# Patient Record
Sex: MALE | NOT HISPANIC OR LATINO | Employment: OTHER | URBAN - METROPOLITAN AREA
[De-identification: names, ages, dates, MRNs, and addresses within clinical notes are randomized per-mention and may not be internally consistent; named-entity substitution may affect disease eponyms.]

---

## 2017-03-28 ENCOUNTER — OFFICE VISIT (OUTPATIENT)
Dept: NEUROSURGERY | Facility: CLINIC | Age: 57
End: 2017-03-28

## 2017-03-28 VITALS
DIASTOLIC BLOOD PRESSURE: 102 MMHG | WEIGHT: 180 LBS | BODY MASS INDEX: 22.38 KG/M2 | HEIGHT: 75 IN | SYSTOLIC BLOOD PRESSURE: 160 MMHG

## 2017-03-28 DIAGNOSIS — M54.42 CHRONIC BILATERAL LOW BACK PAIN WITH BILATERAL SCIATICA: Primary | ICD-10-CM

## 2017-03-28 DIAGNOSIS — M54.41 CHRONIC BILATERAL LOW BACK PAIN WITH BILATERAL SCIATICA: Primary | ICD-10-CM

## 2017-03-28 DIAGNOSIS — G89.29 CHRONIC BILATERAL LOW BACK PAIN WITH BILATERAL SCIATICA: Primary | ICD-10-CM

## 2017-03-28 PROBLEM — M54.50 CHRONIC BILATERAL LOW BACK PAIN: Status: ACTIVE | Noted: 2017-03-28

## 2017-03-28 PROCEDURE — 99243 OFF/OP CNSLTJ NEW/EST LOW 30: CPT | Performed by: NEUROLOGICAL SURGERY

## 2017-03-28 NOTE — PROGRESS NOTES
Subjective   Patient ID: Hugo Ayala is a 56 y.o. male who is being seen for consultation today at the request of Dr. Juan for lumbar radiculopathy. He had a lumbar MRI at [facility] on 2/16/17. He presents unaccompanied.     History of Present Illness   Patient presents for evaluation of back pain. He has had pain for 20 years due to hard work and heavy lifting. The current issues began about 3-4 months ago when he hit hard in a truck bed. He complains of constant aching low back pain associated with numbness and vibrating in his legs R>L. He has trouble with restless legs due to pain. The back and leg pain is progressively worse. They are present in any position and he cannot maintain any position for a prolonged period of time. He also reports bowel incontinence with walking but not when seated. He has a cold feeling with urination. He denies numbness of genitalia. He denies ED.  He also complains of leg weakness. They fatigue easily with activity. He reports not taking any narcotic in the past month. He has not had any recent injections or PT. He had chiropractor in past with worsening of symptoms.     Notes that he has continued to work despite the discomfort.  He had been evaluated by primary care physicians in the past.    The following portions of the patient's history were reviewed and updated as appropriate: allergies, current medications, past family history, past medical history, past social history, past surgical history and problem list.  3    Review of Systems   Constitutional: Positive for chills. Negative for fever.   Respiratory: Negative for cough and shortness of breath.    Cardiovascular: Negative for chest pain, palpitations and leg swelling.   Gastrointestinal: Negative for abdominal pain and constipation.   Genitourinary: Negative for difficulty urinating and enuresis.   Musculoskeletal: Positive for back pain and gait problem. Negative for neck pain.   Skin: Negative for rash.    Neurological: Positive for weakness (BLE) and numbness (or tingling BLE).   Hematological: Does not bruise/bleed easily.   Psychiatric/Behavioral: Negative for sleep disturbance.       Objective   Physical Exam   Constitutional: He is oriented to person, place, and time.   Chronically ill appearing; anxious; smells of cigarettes   Cardiovascular: Intact distal pulses.    Pulmonary/Chest: Effort normal.   Musculoskeletal: He exhibits no edema.        Right hip: He exhibits tenderness (positive Jovanny and Gaenslen). He exhibits normal range of motion.        Left hip: He exhibits tenderness (positive Jovanny and Gaenslen). He exhibits normal range of motion.        Lumbar back: He exhibits decreased range of motion and bony tenderness (midline lumbosacral junction).   Chepe's- pain with simulated turn, overreaction; negative axial load   Neurological: He is alert and oriented to person, place, and time. He has normal strength. He has an abnormal Tandem Gait Test.   Reflex Scores:       Tricep reflexes are 2+ on the right side and 2+ on the left side.       Bicep reflexes are 2+ on the right side and 2+ on the left side.       Brachioradialis reflexes are 2+ on the right side and 2+ on the left side.       Patellar reflexes are 2+ on the right side and 2+ on the left side.       Achilles reflexes are 2+ on the right side and 2+ on the left side.  Skin: Skin is warm and dry.   Psychiatric: His speech is normal. His mood appears anxious. He is hyperactive. Cognition and memory are normal.   Vitals reviewed.    Neurologic Exam     Mental Status   Oriented to person, place, and time.   Attention: normal. Concentration: decreased.   Speech: speech is normal   Level of consciousness: alert  General knowledge: fair.   Normal comprehension.     Motor Exam   Muscle bulk: normal  Overall muscle tone: normal    Strength   Strength 5/5 throughout.     Sensory Exam   Light touch normal.   Vibration normal.   Pinprick normal.         Temperature intact except left lateral calf     Gait, Coordination, and Reflexes     Gait  Gait: (antalgic)    Coordination   Tandem walking coordination: abnormal    Reflexes   Right brachioradialis: 2+  Left brachioradialis: 2+  Right biceps: 2+  Left biceps: 2+  Right triceps: 2+  Left triceps: 2+  Right patellar: 2+  Left patellar: 2+  Right achilles: 2+  Left achilles: 2+  Right Ji: absent  Left Ji: absent  Right ankle clonus: absent  Left ankle clonus: absent       Able to heel and toe walk       Assessment/Plan   Independent Review of Radiographic Studies:        I personally reviewed the MRI of the lumbar spine: This demonstrates some mild to moderate lumbar stenosis at L3 4 and moderate lumbar stenosis at L4 5.  This is primarily lateral recess stenosis.  There are no obvious severe nerve root compressions.      Medical Decision Making:      I confirmed and obtained the above history as recorded by the nurse practitioner acting as a scribe. I performed the above examination and it is documented by the nurse practitioner acting as a scribe.    The patient presents with the above-noted history and physical findings.  He certainly has a lot of pain.  On my review of the bar MRI I do not see a surgical lesion that would alleviate the patient's severe low back discomfort.  I do not recommend surgery at this time.  I think he would be best managed by his primary care physician with efforts at physical therapy, or even a referral to pain management.      Diagnosis: Low-back pain  Degenerative disc disease lumbar spine      Follow up PRN

## 2018-01-26 ENCOUNTER — OFFICE VISIT CONVERTED (OUTPATIENT)
Dept: INTERNAL MEDICINE | Facility: CLINIC | Age: 58
End: 2018-01-26
Attending: INTERNAL MEDICINE

## 2018-01-26 ENCOUNTER — CONVERSION ENCOUNTER (OUTPATIENT)
Dept: INTERNAL MEDICINE | Facility: CLINIC | Age: 58
End: 2018-01-26

## 2018-02-01 ENCOUNTER — OFFICE VISIT CONVERTED (OUTPATIENT)
Dept: INTERNAL MEDICINE | Facility: CLINIC | Age: 58
End: 2018-02-01
Attending: INTERNAL MEDICINE

## 2021-05-16 VITALS
BODY MASS INDEX: 22.57 KG/M2 | DIASTOLIC BLOOD PRESSURE: 76 MMHG | SYSTOLIC BLOOD PRESSURE: 120 MMHG | TEMPERATURE: 98.9 F | WEIGHT: 181.5 LBS | HEIGHT: 75 IN | RESPIRATION RATE: 22 BRPM | HEART RATE: 68 BPM | OXYGEN SATURATION: 96 %

## 2021-05-16 VITALS
SYSTOLIC BLOOD PRESSURE: 141 MMHG | OXYGEN SATURATION: 99 % | TEMPERATURE: 98.3 F | BODY MASS INDEX: 23.31 KG/M2 | DIASTOLIC BLOOD PRESSURE: 101 MMHG | RESPIRATION RATE: 16 BRPM | HEART RATE: 87 BPM | WEIGHT: 187.5 LBS | HEIGHT: 75 IN

## 2022-05-26 ENCOUNTER — OFFICE VISIT (OUTPATIENT)
Dept: UROLOGY | Facility: CLINIC | Age: 62
End: 2022-05-26

## 2022-05-26 VITALS — HEIGHT: 75 IN | WEIGHT: 226.8 LBS | BODY MASS INDEX: 28.2 KG/M2 | RESPIRATION RATE: 16 BRPM

## 2022-05-26 DIAGNOSIS — N40.0 BENIGN PROSTATIC HYPERPLASIA, UNSPECIFIED WHETHER LOWER URINARY TRACT SYMPTOMS PRESENT: ICD-10-CM

## 2022-05-26 DIAGNOSIS — N35.911 STRICTURE OF URETHRAL MEATUS IN MALE, UNSPECIFIED STRICTURE TYPE: ICD-10-CM

## 2022-05-26 DIAGNOSIS — R39.11 URINARY HESITANCY: Primary | ICD-10-CM

## 2022-05-26 PROBLEM — E78.5 HYPERLIPIDEMIA: Status: ACTIVE | Noted: 2022-05-26

## 2022-05-26 PROBLEM — F32.A DEPRESSION: Status: ACTIVE | Noted: 2022-05-26

## 2022-05-26 PROBLEM — F41.9 ANXIETY: Status: ACTIVE | Noted: 2022-05-26

## 2022-05-26 PROBLEM — S09.90XA HEAD INJURY: Status: ACTIVE | Noted: 2022-05-26

## 2022-05-26 PROBLEM — F10.20 ALCOHOLISM (HCC): Status: ACTIVE | Noted: 2022-05-26

## 2022-05-26 PROBLEM — R68.89 FORGETFULNESS: Status: ACTIVE | Noted: 2022-05-26

## 2022-05-26 PROBLEM — D64.9 ANEMIA: Status: ACTIVE | Noted: 2022-05-26

## 2022-05-26 PROBLEM — G43.909 MIGRAINE: Status: ACTIVE | Noted: 2022-05-26

## 2022-05-26 PROBLEM — M19.90 ARTHRITIS: Status: ACTIVE | Noted: 2022-05-26

## 2022-05-26 PROBLEM — I10 HYPERTENSION: Status: ACTIVE | Noted: 2022-05-26

## 2022-05-26 LAB — URINE VOLUME: 63

## 2022-05-26 PROCEDURE — 51798 US URINE CAPACITY MEASURE: CPT | Performed by: NURSE PRACTITIONER

## 2022-05-26 PROCEDURE — 99203 OFFICE O/P NEW LOW 30 MIN: CPT | Performed by: NURSE PRACTITIONER

## 2022-05-26 RX ORDER — PRAZOSIN HYDROCHLORIDE 1 MG/1
1 CAPSULE ORAL DAILY
COMMUNITY
Start: 2022-04-05 | End: 2023-03-22

## 2022-05-26 RX ORDER — DIVALPROEX SODIUM 250 MG/1
250 TABLET, DELAYED RELEASE ORAL DAILY
COMMUNITY
Start: 2022-05-08 | End: 2023-03-22

## 2022-05-26 RX ORDER — OLANZAPINE 10 MG/1
10 TABLET ORAL DAILY
COMMUNITY
Start: 2022-05-10 | End: 2023-03-22

## 2022-05-26 RX ORDER — IBUPROFEN 600 MG/1
600 TABLET ORAL EVERY 8 HOURS
COMMUNITY
Start: 2022-05-10 | End: 2023-03-22

## 2022-05-26 RX ORDER — SIMVASTATIN 20 MG
20 TABLET ORAL DAILY
COMMUNITY
Start: 2022-05-09 | End: 2023-03-22

## 2022-05-26 RX ORDER — TAMSULOSIN HYDROCHLORIDE 0.4 MG/1
1 CAPSULE ORAL DAILY
Qty: 90 CAPSULE | Refills: 3 | Status: SHIPPED | OUTPATIENT
Start: 2022-05-26 | End: 2023-03-10

## 2022-05-26 RX ORDER — AMLODIPINE BESYLATE 10 MG/1
10 TABLET ORAL DAILY
COMMUNITY
Start: 2022-04-05 | End: 2023-03-22

## 2022-05-26 RX ORDER — ASPIRIN 81 MG/1
81 TABLET, CHEWABLE ORAL DAILY
COMMUNITY
Start: 2022-05-09 | End: 2023-03-22

## 2022-05-26 RX ORDER — CLONIDINE HYDROCHLORIDE 0.2 MG/1
0.2 TABLET ORAL DAILY
COMMUNITY
Start: 2022-04-05 | End: 2023-03-22

## 2022-05-26 NOTE — PROGRESS NOTES
Chief Complaint: Urinary Frequency    Subjective         History of Present Illness  Hugo Ayala is a 62 y.o. male presents to [facility] UROLOGY to be seen for urinary frequency.    Patient reports for the last 6 months urinary hesitancy and a weak and intermittent stream.    He states that he has issues with constipation as well. He reports that he had a back injury several years ago and since that time he is unable to feel when he has a bowel movement and goes every 3-4 days.    He states he has spraying and splaying of stream.     Urinary frequency.     Nocturia x 4     Drinks 5 cups of coffee a day and mountian dew.     Objective     Past Medical History:   Diagnosis Date   • Arthritis    • Hypertension        Past Surgical History:   Procedure Laterality Date   • APPENDECTOMY     • EYE SURGERY  2007   • KNEE SURGERY Right 2005         Current Outpatient Medications:   •  amLODIPine (NORVASC) 10 MG tablet, Take 10 mg by mouth Daily., Disp: , Rfl:   •  aspirin 81 MG chewable tablet, Chew 81 mg Daily., Disp: , Rfl:   •  cloNIDine (CATAPRES) 0.2 MG tablet, Take 0.2 mg by mouth Daily., Disp: , Rfl:   •  divalproex (DEPAKOTE) 250 MG DR tablet, Take 250 mg by mouth Daily., Disp: , Rfl:   •  ibuprofen (ADVIL,MOTRIN) 600 MG tablet, Take 600 mg by mouth Every 8 (Eight) Hours., Disp: , Rfl:   •  OLANZapine (zyPREXA) 10 MG tablet, Take 10 mg by mouth Daily., Disp: , Rfl:   •  prazosin (MINIPRESS) 1 MG capsule, Take 1 mg by mouth Daily., Disp: , Rfl:   •  simvastatin (ZOCOR) 20 MG tablet, Take 20 mg by mouth Daily., Disp: , Rfl:   •  tamsulosin (FLOMAX) 0.4 MG capsule 24 hr capsule, Take 1 capsule by mouth Daily for 360 days., Disp: 90 capsule, Rfl: 3    No Known Allergies     Family History   Problem Relation Age of Onset   • Cancer Mother    • Cancer Sister    • Cancer Brother        Social History     Socioeconomic History   • Marital status:    Tobacco Use   • Smoking status: Current Every  "Day Smoker     Packs/day: 1.00     Types: Cigarettes   • Smokeless tobacco: Never Used   Substance and Sexual Activity   • Alcohol use: Yes     Comment: occ   • Drug use: No   • Sexual activity: Defer       Vital Signs:   Resp 16   Ht 190.5 cm (75\")   Wt 103 kg (226 lb 12.8 oz)   BMI 28.35 kg/m²      Physical Exam  Genitourinary:     Comments: Mild meatal stenosis          Result Review :   The following data was reviewed by: ALVINA Pittman on 05/26/2022:  Results for orders placed or performed in visit on 05/26/22   Bladder Scan   Result Value Ref Range    Urine Volume 63        Bladder Scan interpretation 05/26/2022    Estimation of residual urine via BVI 3000 Verathon Bladder Scan  MA/nurse performing:Senthil MAYO RN   Residual Urine: 63 ml  Indication: Urinary hesitancy    Benign prostatic hyperplasia, unspecified whether lower urinary tract symptoms present    Stricture of urethral meatus in male, unspecified stricture type   Position: Supine  Examination: Incremental scanning of the suprapubic area using 2.0 MHz transducer using copious amounts of acoustic gel.   Findings: An anechoic area was demonstrated which represented the bladder, with measurement of residual urine as noted. I inspected this myself. In that the residual urine was insignificant, refer to plan for treatment and plan necessary at this time.           Procedures        Assessment and Plan    Diagnoses and all orders for this visit:    1. Urinary hesitancy (Primary)  -     Bladder Scan    2. Benign prostatic hyperplasia, unspecified whether lower urinary tract symptoms present    3. Stricture of urethral meatus in male, unspecified stricture type  -     tamsulosin (FLOMAX) 0.4 MG capsule 24 hr capsule; Take 1 capsule by mouth Daily for 360 days.  Dispense: 90 capsule; Refill: 3        Tejas with patient findings on exam as well as symptoms are consistent with meatal stenosis possible BPH.  Nevertheless I would like to start him on " tamsulosin 0.4 mg daily to see if this will help to alleviate some of his urinary urgency and frequency and straining to urinate.  Discussed common side effects of this medication and we will follow-up with him in 4 weeks or sooner if needed.    I spent  30 minutes caring for Hugo on this date of service. This time includes time spent by me in the following activities:reviewing tests, obtaining and/or reviewing a separately obtained history, performing a medically appropriate examination and/or evaluation , counseling and educating the patient/family/caregiver, ordering medications, tests, or procedures, and documenting information in the medical record  Follow Up   Return in about 4 weeks (around 6/23/2022) for f/u BPH .  Patient was given instructions and counseling regarding his condition or for health maintenance advice. Please see specific information pulled into the AVS if appropriate.         This document has been electronically signed by ALVINA Pittman  May 26, 2022 15:27 EDT

## 2022-05-27 ENCOUNTER — OFFICE VISIT (OUTPATIENT)
Dept: NEUROLOGY | Facility: CLINIC | Age: 62
End: 2022-05-27

## 2022-05-27 VITALS — HEART RATE: 71 BPM | DIASTOLIC BLOOD PRESSURE: 92 MMHG | SYSTOLIC BLOOD PRESSURE: 123 MMHG

## 2022-05-27 DIAGNOSIS — G25.0 ESSENTIAL TREMOR: Primary | ICD-10-CM

## 2022-05-27 PROCEDURE — 99204 OFFICE O/P NEW MOD 45 MIN: CPT | Performed by: PSYCHIATRY & NEUROLOGY

## 2022-05-27 RX ORDER — PRIMIDONE 50 MG/1
TABLET ORAL
Qty: 300 TABLET | Refills: 2 | Status: SHIPPED | OUTPATIENT
Start: 2022-05-27 | End: 2023-03-22

## 2022-05-27 NOTE — PROGRESS NOTES
Chief Complaint  Neurologic Problem (HX OF STROKE.)    Subjective          Hugo Ayala is a 62 y.o. male who presents to [facility]OS[facility]GERY  History of Present Illness  62-year-old man evaluated for tremors.  He states that he was told that he had a stroke about a year ago while he was incarcerated.  His body started shaking for 3 days.  He was told that his blood pressure was low and that he had a stroke.  He did not have any focal neurologic deficits.  He denies any speech difficulty at that time.  He states that he has been living with his sister for the last 2 months and he wants to be evaluated.  He has had a tremor for several years.  His father had significant disabling tremor.  He states that the tremor bothers him with activities of daily living such as eating and drinking as well as doing fine movements.  He is taking medications at this time for anxiety and depression including divalproex sodium 250 mg, Zyprexa 10 mg.  He states that he not drink alcohol although there is alcoholism noted in his history.  He does not know if alcohol relieves the tremor.  He states that he has a tremor in his leg as well and it shakes all the time.  Objective   Vital Signs:   /92 (BP Location: Right arm, Patient Position: Sitting, Cuff Size: Adult)   Pulse 71     Physical Exam   He is alert, fluent, aphasic, follows commands well.  Optic disc are normal bilaterally visual fields full confrontation, EOMs full all directions gaze, facial strength is full, soft palate elevation and tongue normal.  There is no weakness of the upper or lower extremities individual muscle testing.  Fine finger movements are intact.  There is a tremor noted hands extended and finger-to-nose testing as he reaches the target.  He has significant difficulty transferring water from 1 cup to the next.  There is a tremor noted with his pretending to drink a cup.  There is a mild tremor noted on Archimedes  spiral.  Cerebellar testing is intact.  Reflexes are decreased in the biceps, triceps, patellar's and ankles not tested.  Station gait is able to ambulate without difficulty.  He has slight difficulty with tandem.  Armswing is normal, turning is intact.  Heart is regular rhythm normal in rate        Assessment and Plan  Diagnoses and all orders for this visit:    1. Essential tremor (Primary)  Assessment & Plan:  I discussed with him and his sister that he has essential tremor.  He wants treatment for this.  I will start him on primidone at 50 mg nightly initially and increase the dose by 50 mg every 3 days.  He is to increase it until his tremors are improved or has adverse effects.  I discussed with him the adverse effects including feeling like intoxicated, mood swings.  I gave him a titration schedule on a separate piece of paper.  He is to stay at the lowest dose that controls his tremor.  I discussed with him that we need to check liver function testing as well as CBC in the future.  I also discussed with him that it may interfere with Depakote however he is taking very low-dose of Depakote that it is unlikely to make a difference.    I discussed with him that he did not have a stroke.  I will order an MRI of the brain see him again in 7 weeks time for follow-up.    Orders:  -     MRI Brain Without Contrast; Future    Other orders  -     primidone (MYSOLINE) 50 MG tablet; Take 1 tablet nightly and increase every 3 days as directed up to 5 tablets twice daily.  Dispense: 300 tablet; Refill: 2       Total time spent with the patient and coordinating patient care was 45 minutes.    Follow Up  No follow-ups on file.  Patient was given instructions and counseling regarding his condition or for health maintenance advice. Please see specific information pulled into the AVS if appropriate.     atellar's and absent in the ankles.

## 2022-05-27 NOTE — ASSESSMENT & PLAN NOTE
I discussed with him and his sister that he has essential tremor.  He wants treatment for this.  I will start him on primidone at 50 mg nightly initially and increase the dose by 50 mg every 3 days.  He is to increase it until his tremors are improved or has adverse effects.  I discussed with him the adverse effects including feeling like intoxicated, mood swings.  I gave him a titration schedule on a separate piece of paper.  He is to stay at the lowest dose that controls his tremor.  I discussed with him that we need to check liver function testing as well as CBC in the future.  I also discussed with him that it may interfere with Depakote however he is taking very low-dose of Depakote that it is unlikely to make a difference.    I discussed with him that he did not have a stroke.  I will order an MRI of the brain see him again in 7 weeks time for follow-up.

## 2022-06-06 ENCOUNTER — TELEPHONE (OUTPATIENT)
Dept: NEUROLOGY | Facility: CLINIC | Age: 62
End: 2022-06-06

## 2022-06-06 RX ORDER — PRIMIDONE 50 MG/1
TABLET ORAL
Qty: 300 TABLET | Refills: 2 | Status: CANCELLED | OUTPATIENT
Start: 2022-06-06

## 2022-06-06 NOTE — TELEPHONE ENCOUNTER
Caller: POLO CORTEZ    Relationship: Emergency Contact; SISTER    Best call back number: [phone]     [id number] - [place], [place] - [address] AT [facility] - [phone] [facility] [phone]     What was the call regarding: PT'S SISTER CALLED STATING THAT [facility] STATES THEY NEVER RECEIVED PRIMIDONE RX FOR PT. I ADVISED PT'S SISTER THAT PHARMACY CONFIRMED RECEIVED IN 5/27/22 @ 4:35PM. PT'S SISTER IS REQUESTING THAT OFFICE RESEND RX.    Do you require a callback: IF NEEDED.    PLEASE REVIEW AND ADVISE.

## 2022-06-08 NOTE — TELEPHONE ENCOUNTER
Upon further investigation, the problem is that the insurance will not pay for primidone without a diagnosis of seizures and this is being prescribed for essential tremor. The patient will have to get this through [employer]. Attempted to call to discuss and was unable to reach anyone. Will try again later.

## 2022-06-14 NOTE — TELEPHONE ENCOUNTER
PTS SISTER STATES THAT PHARMACY TOLD HER THAT THIS IS NOT COVERED BY INSURANCE PLEASE CALL POLO BACK @ [phone]

## 2022-06-15 NOTE — TELEPHONE ENCOUNTER
Attempted to reach pt or sister and left a vm on # [phone] asking for a call  back to discuss. I had previsously called the primary # for the pt @ [phone] and it has been giving a quick busy signal and I have not been able to leave a message at that number.

## 2022-06-20 NOTE — TELEPHONE ENCOUNTER
I was able to speak to the pts sister and advised her that the pts insurance would not approve Primidone for anything other than for a diagnosis of seizures and that I could get them a GoodRx discount code that would make it approximately $25 per month and she voiced understanding but said he could not afford it because he does not currently have any income right now and is awaiting disability. She is asking if there is an alternative he may try instead.

## 2022-06-21 NOTE — TELEPHONE ENCOUNTER
He can make a follow-up visit here and I will evaluate him to see if he is a candidate for propranolol.

## 2022-06-22 ENCOUNTER — OFFICE VISIT (OUTPATIENT)
Dept: NEUROLOGY | Facility: CLINIC | Age: 62
End: 2022-06-22

## 2022-06-22 VITALS
HEART RATE: 87 BPM | HEIGHT: 75 IN | SYSTOLIC BLOOD PRESSURE: 121 MMHG | DIASTOLIC BLOOD PRESSURE: 88 MMHG | BODY MASS INDEX: 27.98 KG/M2 | WEIGHT: 225 LBS

## 2022-06-22 DIAGNOSIS — G25.0 ESSENTIAL TREMOR: Primary | ICD-10-CM

## 2022-06-22 PROCEDURE — 99213 OFFICE O/P EST LOW 20 MIN: CPT | Performed by: PSYCHIATRY & NEUROLOGY

## 2022-06-22 NOTE — PROGRESS NOTES
"Chief Complaint  Medication Problem (Insurance will not cover primidone. )    Subjective          Hugo Ayala is a 62 y.o. male who presents to [facility] NEUROLOGY & NEUROSURGERY  History of Present Illness  62-year-old man here for follow-up of his essential tremor.  His insurance would not pay for the primidone.  He has a history of COPD.  He has a history of hypertension.  He did not get his MRI of the brain performed at this time.    Objective   Vital Signs:   /88 (BP Location: Right arm, Patient Position: Sitting, Cuff Size: Adult)   Pulse 87   Ht 190.5 cm (75\")   Wt 102 kg (225 lb)   BMI 28.12 kg/m²     Physical Exam   Alert, fluent, aphasic, follows commands well.  There is a tremor noted since extended and finger-to-nose testing.  Heart is regular rhythm normal in rate.        Assessment and Plan  Diagnoses and all orders for this visit:    1. Essential tremor (Primary)  Assessment & Plan:  I discussed with him and his wife that he should be started on propanolol by his primary care provider since he has hypertension and COPD.  I discussed with him that I do not know if he has asthmatic component to his COPD.  I would recommend for him to be started on propranolol at 40 mg twice daily the first week and increase the dose as needed according to his tremor control to 40 mg every week in two divided doses until is taking up to 120 mg twice a day.  He is to stay at the lowest dose that controls his tremors.  I discussed with his wife that they need to take his pulse prior to increasing the dose weekly.  I discussed with him the adverse effects of propranolol.  See him again in 2 months time for follow-up.         Total time spent with the patient and coordinating patient care was 25 minutes.    Follow Up  No follow-ups on file.  Patient was given instructions and counseling regarding his condition or for health maintenance advice. Please see specific information pulled into the " AVS if appropriate.

## 2022-06-22 NOTE — ASSESSMENT & PLAN NOTE
I discussed with him and his wife that he should be started on propanolol by his primary care provider since he has hypertension and COPD.  I discussed with him that I do not know if he has asthmatic component to his COPD.  I would recommend for him to be started on propranolol at 40 mg twice daily the first week and increase the dose as needed according to his tremor control to 40 mg every week in two divided doses until is taking up to 120 mg twice a day.  He is to stay at the lowest dose that controls his tremors.  I discussed with his wife that they need to take his pulse prior to increasing the dose weekly.  I discussed with him the adverse effects of propranolol.  See him again in 2 months time for follow-up.

## 2022-06-27 ENCOUNTER — HOSPITAL ENCOUNTER (OUTPATIENT)
Dept: MRI IMAGING | Facility: HOSPITAL | Age: 62
Discharge: HOME | End: 2022-06-27
Admitting: PSYCHIATRY & NEUROLOGY

## 2022-06-27 DIAGNOSIS — G25.0 ESSENTIAL TREMOR: ICD-10-CM

## 2022-06-27 PROCEDURE — 70551 MRI BRAIN STEM W/O DYE: CPT

## 2022-07-01 ENCOUNTER — TELEPHONE (OUTPATIENT)
Dept: UROLOGY | Facility: CLINIC | Age: 62
End: 2022-07-01

## 2022-07-06 NOTE — TELEPHONE ENCOUNTER
3RD ATTEMPT CALLED PT TO TRY TO R/S NO SHOWED APPT WITH [facility] FROM 7/1/NVM FOR PT/MS/ANYTHING ELSE TO DO?

## 2022-08-15 ENCOUNTER — TELEPHONE (OUTPATIENT)
Dept: NEUROLOGY | Facility: CLINIC | Age: 62
End: 2022-08-15

## 2022-08-15 NOTE — TELEPHONE ENCOUNTER
PATIENT IS TELLING PCP THAT DR DAVENPORT WANTS HIM TO REFILL THE PRIMIDONE SCRIPT.    PCP IS NOT COMFORTABLE DOING THAT.    PLEASE ADVISE.    THANK YOU

## 2022-08-15 NOTE — TELEPHONE ENCOUNTER
"Per last office note, patient's insurance will not cover primidone \" discussed with him and his wife that he should be started on propanolol by his primary care provider since he has hypertension and COPD.  I discussed with him that I do not know if he has asthmatic component to his COPD.  I would recommend for him to be started on propranolol at 40 mg twice daily the first week \"     Will need to call patient to verify who his PCP is as it looks like he no showed appt with [facility] in July.  "

## 2022-08-16 NOTE — TELEPHONE ENCOUNTER
Called primary number on file but it his brother in law's phone, patient was not available to talk, he asked me to call his sister at [phone] voicemail is not set up. Need to clarify who patient's pcp is as Dr. Samuels's office stated they do not have a patient with that matching information.

## 2022-08-18 NOTE — TELEPHONE ENCOUNTER
Have not been able to contact patient, reached out to Emily Siddiqui's office as her name was on the referral when he was initially seen by us but not listed in his chart, that is his PCP office. They stated that they were able to pull records off of [facility] and see the recommendation for propranolol & have discussed & prescribed. I have updated epic PCP.

## 2023-03-10 DIAGNOSIS — N35.911 STRICTURE OF URETHRAL MEATUS IN MALE, UNSPECIFIED STRICTURE TYPE: ICD-10-CM

## 2023-03-10 RX ORDER — TAMSULOSIN HYDROCHLORIDE 0.4 MG/1
1 CAPSULE ORAL DAILY
Qty: 30 CAPSULE | Refills: 0 | Status: SHIPPED | OUTPATIENT
Start: 2023-03-10 | End: 2023-03-22

## 2023-03-10 NOTE — TELEPHONE ENCOUNTER
Tried contacting pt to let him know that he will need a follow up appointment to have his tamsulosin refilled. Pt didn't have voicemail box set up and none of his other contact numbers were in service. I will send one months worth of it to his pharmacy but he needs an appt.

## 2023-03-22 ENCOUNTER — APPOINTMENT (OUTPATIENT)
Dept: GENERAL RADIOLOGY | Facility: HOSPITAL | Age: 63
End: 2023-03-22
Payer: COMMERCIAL

## 2023-03-22 ENCOUNTER — HOSPITAL ENCOUNTER (EMERGENCY)
Facility: HOSPITAL | Age: 63
Discharge: HOME | End: 2023-03-22
Attending: EMERGENCY MEDICINE | Admitting: EMERGENCY MEDICINE
Payer: COMMERCIAL

## 2023-03-22 VITALS
HEART RATE: 94 BPM | SYSTOLIC BLOOD PRESSURE: 128 MMHG | OXYGEN SATURATION: 98 % | HEIGHT: 75 IN | WEIGHT: 212.3 LBS | TEMPERATURE: 98.1 F | DIASTOLIC BLOOD PRESSURE: 88 MMHG | BODY MASS INDEX: 26.4 KG/M2 | RESPIRATION RATE: 24 BRPM

## 2023-03-22 DIAGNOSIS — S39.92XA INJURY OF LOW BACK, INITIAL ENCOUNTER: Primary | ICD-10-CM

## 2023-03-22 DIAGNOSIS — M54.50 ACUTE LEFT-SIDED LOW BACK PAIN, UNSPECIFIED WHETHER SCIATICA PRESENT: ICD-10-CM

## 2023-03-22 DIAGNOSIS — M51.36 DEGENERATION, INTERVERTEBRAL DISC, LUMBAR: ICD-10-CM

## 2023-03-22 PROCEDURE — 96372 THER/PROPH/DIAG INJ SC/IM: CPT

## 2023-03-22 PROCEDURE — 99283 EMERGENCY DEPT VISIT LOW MDM: CPT

## 2023-03-22 PROCEDURE — 25010000002 KETOROLAC TROMETHAMINE PER 15 MG: Performed by: NURSE PRACTITIONER

## 2023-03-22 PROCEDURE — 63710000001 PREDNISONE PER 1 MG: Performed by: NURSE PRACTITIONER

## 2023-03-22 PROCEDURE — 72100 X-RAY EXAM L-S SPINE 2/3 VWS: CPT

## 2023-03-22 RX ORDER — KETOROLAC TROMETHAMINE 10 MG/1
10 TABLET, FILM COATED ORAL EVERY 6 HOURS PRN
Qty: 20 TABLET | Refills: 0 | Status: SHIPPED | OUTPATIENT
Start: 2023-03-22

## 2023-03-22 RX ORDER — SODIUM CHLORIDE 0.9 % (FLUSH) 0.9 %
10 SYRINGE (ML) INJECTION AS NEEDED
Status: DISCONTINUED | OUTPATIENT
Start: 2023-03-22 | End: 2023-03-22

## 2023-03-22 RX ORDER — CYCLOBENZAPRINE HCL 10 MG
10 TABLET ORAL 3 TIMES DAILY PRN
Qty: 30 TABLET | Refills: 0 | Status: SHIPPED | OUTPATIENT
Start: 2023-03-22

## 2023-03-22 RX ORDER — PREDNISONE 20 MG/1
40 TABLET ORAL ONCE
Status: COMPLETED | OUTPATIENT
Start: 2023-03-22 | End: 2023-03-22

## 2023-03-22 RX ORDER — CYCLOBENZAPRINE HCL 10 MG
10 TABLET ORAL ONCE
Status: COMPLETED | OUTPATIENT
Start: 2023-03-22 | End: 2023-03-22

## 2023-03-22 RX ORDER — KETOROLAC TROMETHAMINE 30 MG/ML
30 INJECTION, SOLUTION INTRAMUSCULAR; INTRAVENOUS ONCE
Status: COMPLETED | OUTPATIENT
Start: 2023-03-22 | End: 2023-03-22

## 2023-03-22 RX ORDER — PREDNISONE 20 MG/1
20 TABLET ORAL 2 TIMES DAILY
Qty: 10 TABLET | Refills: 0 | Status: SHIPPED | OUTPATIENT
Start: 2023-03-22

## 2023-03-22 RX ADMIN — CYCLOBENZAPRINE 10 MG: 10 TABLET, FILM COATED ORAL at 18:06

## 2023-03-22 RX ADMIN — KETOROLAC TROMETHAMINE 30 MG: 30 INJECTION, SOLUTION INTRAMUSCULAR; INTRAVENOUS at 17:41

## 2023-03-22 RX ADMIN — PREDNISONE 40 MG: 20 TABLET ORAL at 18:07

## 2023-03-22 NOTE — ED PROVIDER NOTES
Time: 5:14 PM EDT  Date of encounter:  3/22/2023  Independent Historian/Clinical History and Information was obtained by:   Patient  Chief Complaint: Low back pain    History is limited by: N/A    History of Present Illness:  Patient is a 62 y.o. year old male who presents to the emergency department for evaluation of low back pain that started yesterday morning when he picked up a coffee table.  He said it is happened several times but its been a couple of years since it happened.  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  He says he has slipped disks and degenerative disc disease.  He reports his pain is radiating down both hips and up his back.  The primary source of pain is on the low lower left lumbar region.  He denies urinary or fecal incontinence and saddle anesthesia.    HPI    Patient Care Team  Primary Care Provider: Emily Siddiqui APRN    Past Medical History:     No Known Allergies  Past Medical History:   Diagnosis Date   • Anxiety    • Arthritis    • COPD (chronic obstructive pulmonary disease) (HCC)    • Hypertension    • Injury of back      Past Surgical History:   Procedure Laterality Date   • APPENDECTOMY     • EYE SURGERY  2007   • KNEE SURGERY Right 2005     Family History   Problem Relation Age of Onset   • Cancer Mother    • Cancer Sister    • Cancer Brother        Home Medications:  Prior to Admission medications    Medication Sig Start Date End Date Taking? Authorizing Provider   amLODIPine (NORVASC) 10 MG tablet Take 10 mg by mouth Daily. 4/5/22 3/22/23  Fanny Lamb MD   aspirin 81 MG chewable tablet Chew 81 mg Daily. 5/9/22 3/22/23  Fanny Lamb MD   cloNIDine (CATAPRES) 0.2 MG tablet Take 0.2 mg by mouth Daily. 4/5/22 3/22/23  Fanny Lamb MD   divalproex (DEPAKOTE) 250 MG DR tablet Take 250 mg by mouth Daily. 5/8/22 3/22/23  Fanny Lamb MD   ibuprofen (ADVIL,MOTRIN) 600 MG tablet Take  "600 mg by mouth Every 8 (Eight) Hours. 5/10/22 3/22/23  Fanny Lamb MD   OLANZapine (zyPREXA) 10 MG tablet Take 10 mg by mouth Daily. 5/10/22 3/22/23  Fanny Lamb MD   prazosin (MINIPRESS) 1 MG capsule Take 1 mg by mouth Daily. 4/5/22 3/22/23  Fanny Lamb MD   primidone (MYSOLINE) 50 MG tablet Take 1 tablet nightly and increase every 3 days as directed up to 5 tablets twice daily. 5/27/22 3/22/23  Ladarius Alvarado MD   simvastatin (ZOCOR) 20 MG tablet Take 20 mg by mouth Daily. 5/9/22 3/22/23  Fanny Lamb MD   tamsulosin (FLOMAX) 0.4 MG capsule 24 hr capsule Take 1 capsule by mouth Daily. 3/10/23 3/22/23  Becky Joy, APRN        Social History:   Social History     Tobacco Use   • Smoking status: Every Day     Packs/day: 1.00     Types: Cigars, Cigarettes   • Smokeless tobacco: Never   Substance Use Topics   • Alcohol use: Not Currently     Comment: occ   • Drug use: No         Review of Systems:  Review of Systems   Musculoskeletal: Positive for back pain and gait problem.   Neurological: Negative for numbness.        Physical Exam:  /88 (BP Location: Left arm, Patient Position: Sitting)   Pulse 94   Temp 98.1 °F (36.7 °C) (Oral)   Resp 24   Ht 190.5 cm (75\")   Wt 96.3 kg (212 lb 4.9 oz)   SpO2 98%   BMI 26.54 kg/m²     Physical Exam  Constitutional:       General: He is not in acute distress.     Appearance: Normal appearance. He is normal weight. He is not ill-appearing or toxic-appearing.   Cardiovascular:      Rate and Rhythm: Normal rate.   Pulmonary:      Effort: Pulmonary effort is normal. No respiratory distress.   Musculoskeletal:         General: Tenderness (Low back pain radiating down the hips and up) present.      Cervical back: Normal range of motion and neck supple.   Skin:     General: Skin is dry.   Neurological:      General: No focal deficit present.      Mental Status: He is alert and oriented to person, place, and time.      " Sensory: No sensory deficit.   Psychiatric:         Mood and Affect: Mood normal.         Behavior: Behavior normal.                  Procedures:  Procedures      Medical Decision Making:      Comorbidities that affect care:    Arthritis, anxiety injury, COPD, Hypertension    External Notes reviewed:    None      The following orders were placed and all results were independently analyzed by me:  Orders Placed This Encounter   Procedures   • XR Spine Lumbar 2 or 3 View       Medications Given in the Emergency Department:  Medications   ketorolac (TORADOL) injection 30 mg (30 mg Intramuscular Given 3/22/23 1741)   predniSONE (DELTASONE) tablet 40 mg (40 mg Oral Given 3/22/23 1807)   cyclobenzaprine (FLEXERIL) tablet 10 mg (10 mg Oral Given 3/22/23 1806)        ED Course:         Labs:    Lab Results (last 24 hours)     ** No results found for the last 24 hours. **           Imaging:    XR Spine Lumbar 2 or 3 View    Result Date: 3/22/2023  PROCEDURE: XR SPINE LUMBAR 2 OR 3 VW  COMPARISON: [facility], CT, CT CHEST ABD PEL W CONTRAST, 11/25/2018, 20:38.  [facility], CR, LS-SPINE - AP & LAT, 4/23/2017, 13:00.  INDICATIONS: pain, injury  FINDINGS:  Vertebral body heights are well maintained.  Mild narrowing of lumbar intervertebral disc spaces is seen, with endplate spurring throughout the lumbar spine.  Moderate facet arthropathy is seen throughout the lumbar spine, most severe at L4-5 and L5-S1.  No fractures or subluxations are seen.  The abdominal aorta is ectatic.  Infrarenal abdominal aortic aneurysm measures at least 3 cm in diameter.         Lumbar spine series demonstrating multi level degenerative change.  Fusiform infrarenal abdominal aortic aneurysm measures at least 3 cm in diameter.     CHUCHO LANZA MD       Electronically Signed and Approved By: CHUCHO LANZA MD on 3/22/2023 at 18:24                 Differential Diagnosis and Discussion:    Back Pain: The patient presents  with back pain. My differential diagnosis includes but is not limited to acute spinal epidural abscess, acute spinal epidural bleed, cauda equina syndrome, abdominal aortic aneurysm, aortic dissection, kidney stone, pyelonephritis, musculoskeletal back pain, spinal fracture, and osteoarthritis.     All X-rays were independently reviewed by me.    MDM  Number of Diagnoses or Management Options     Amount and/or Complexity of Data Reviewed  Tests in the radiology section of CPT®: reviewed             Patient Care Considerations:    NARCOTICS: I considered prescribing opiate pain medication as an outpatient, however They were not indicated.      Consultants/Shared Management Plan:    None    Social Determinants of Health:    Patient is independent, reliable, and has access to care.       Disposition and Care Coordination:    Discharged: The patient is suitable and stable for discharge with no need for consideration of observation or admission.    I have explained the patient´s condition, diagnoses and treatment plan based on the information available to me at this time. I have answered questions and addressed any concerns. The patient has a good  understanding of the patient´s diagnosis, condition, and treatment plan as can be expected at this point. The vital signs have been stable. The patient´s condition is stable and appropriate for discharge from the emergency department.      The patient will pursue further outpatient evaluation with the primary care physician or other designated or consulting physician as outlined in the discharge instructions. They are agreeable to this plan of care and follow-up instructions have been explained in detail. The patient has received these instructions in written format and have expressed an understanding of the discharge instructions. The patient is aware that any significant change in condition or worsening of symptoms should prompt an immediate return to this or the closest  emergency department or call to 911.    Final diagnoses:   Injury of low back, initial encounter   Degeneration, intervertebral disc, lumbar   Acute left-sided low back pain, unspecified whether sciatica present        ED Disposition     ED Disposition   Discharge    Condition   Stable    Comment   --             This medical record created using voice recognition software.           Olga Velazquez, APRN  03/22/23 5147

## 2024-04-13 ENCOUNTER — HOSPITAL ENCOUNTER (EMERGENCY)
Facility: HOSPITAL | Age: 64
Discharge: LEFT BEFORE DISCHARGE | End: 2024-04-13
Payer: MEDICAID

## 2024-04-13 ENCOUNTER — APPOINTMENT (OUTPATIENT)
Dept: CT IMAGING | Facility: HOSPITAL | Age: 64
End: 2024-04-13
Payer: MEDICAID

## 2024-04-13 VITALS
SYSTOLIC BLOOD PRESSURE: 147 MMHG | BODY MASS INDEX: 21.71 KG/M2 | TEMPERATURE: 97.6 F | HEIGHT: 75 IN | RESPIRATION RATE: 18 BRPM | HEART RATE: 97 BPM | OXYGEN SATURATION: 98 % | WEIGHT: 174.6 LBS | DIASTOLIC BLOOD PRESSURE: 81 MMHG

## 2024-04-13 LAB
ALBUMIN SERPL-MCNC: 3.8 G/DL (ref 3.5–5.2)
ALBUMIN/GLOB SERPL: 1.5 G/DL
ALP SERPL-CCNC: 121 U/L (ref 39–117)
ALT SERPL W P-5'-P-CCNC: 17 U/L (ref 1–41)
AMPHET+METHAMPHET UR QL: POSITIVE
ANION GAP SERPL CALCULATED.3IONS-SCNC: 10.7 MMOL/L (ref 5–15)
APAP SERPL-MCNC: <5 MCG/ML (ref 0–30)
AST SERPL-CCNC: 22 U/L (ref 1–40)
BARBITURATES UR QL SCN: NEGATIVE
BASOPHILS # BLD AUTO: 0.05 10*3/MM3 (ref 0–0.2)
BASOPHILS NFR BLD AUTO: 0.8 % (ref 0–1.5)
BENZODIAZ UR QL SCN: NEGATIVE
BILIRUB SERPL-MCNC: <0.2 MG/DL (ref 0–1.2)
BUN SERPL-MCNC: 20 MG/DL (ref 8–23)
BUN/CREAT SERPL: 18.3 (ref 7–25)
CALCIUM SPEC-SCNC: 9.8 MG/DL (ref 8.6–10.5)
CANNABINOIDS SERPL QL: POSITIVE
CHLORIDE SERPL-SCNC: 104 MMOL/L (ref 98–107)
CO2 SERPL-SCNC: 26.3 MMOL/L (ref 22–29)
COCAINE UR QL: NEGATIVE
CREAT SERPL-MCNC: 1.09 MG/DL (ref 0.76–1.27)
DEPRECATED RDW RBC AUTO: 44.6 FL (ref 37–54)
EGFRCR SERPLBLD CKD-EPI 2021: 76.3 ML/MIN/1.73
EOSINOPHIL # BLD AUTO: 0.12 10*3/MM3 (ref 0–0.4)
EOSINOPHIL NFR BLD AUTO: 1.8 % (ref 0.3–6.2)
ERYTHROCYTE [DISTWIDTH] IN BLOOD BY AUTOMATED COUNT: 13.9 % (ref 12.3–15.4)
ETHANOL BLD-MCNC: <10 MG/DL (ref 0–10)
ETHANOL UR QL: <0.01 %
FENTANYL UR-MCNC: NEGATIVE NG/ML
GLOBULIN UR ELPH-MCNC: 2.6 GM/DL
GLUCOSE SERPL-MCNC: 110 MG/DL (ref 65–99)
HCT VFR BLD AUTO: 43.6 % (ref 37.5–51)
HGB BLD-MCNC: 13.1 G/DL (ref 13–17.7)
HOLD SPECIMEN: NORMAL
HOLD SPECIMEN: NORMAL
IMM GRANULOCYTES # BLD AUTO: 0.01 10*3/MM3 (ref 0–0.05)
IMM GRANULOCYTES NFR BLD AUTO: 0.2 % (ref 0–0.5)
LYMPHOCYTES # BLD AUTO: 1.64 10*3/MM3 (ref 0.7–3.1)
LYMPHOCYTES NFR BLD AUTO: 24.7 % (ref 19.6–45.3)
MCH RBC QN AUTO: 26.5 PG (ref 26.6–33)
MCHC RBC AUTO-ENTMCNC: 30 G/DL (ref 31.5–35.7)
MCV RBC AUTO: 88.1 FL (ref 79–97)
METHADONE UR QL SCN: NEGATIVE
MONOCYTES # BLD AUTO: 0.45 10*3/MM3 (ref 0.1–0.9)
MONOCYTES NFR BLD AUTO: 6.8 % (ref 5–12)
NEUTROPHILS NFR BLD AUTO: 4.38 10*3/MM3 (ref 1.7–7)
NEUTROPHILS NFR BLD AUTO: 65.7 % (ref 42.7–76)
NRBC BLD AUTO-RTO: 0 /100 WBC (ref 0–0.2)
OPIATES UR QL: NEGATIVE
OXYCODONE UR QL SCN: NEGATIVE
PLATELET # BLD AUTO: 287 10*3/MM3 (ref 140–450)
PMV BLD AUTO: 10 FL (ref 6–12)
POTASSIUM SERPL-SCNC: 4.5 MMOL/L (ref 3.5–5.2)
PROT SERPL-MCNC: 6.4 G/DL (ref 6–8.5)
RBC # BLD AUTO: 4.95 10*6/MM3 (ref 4.14–5.8)
SALICYLATES SERPL-MCNC: <0.3 MG/DL
SODIUM SERPL-SCNC: 141 MMOL/L (ref 136–145)
WBC NRBC COR # BLD AUTO: 6.65 10*3/MM3 (ref 3.4–10.8)
WHOLE BLOOD HOLD COAG: NORMAL
WHOLE BLOOD HOLD SPECIMEN: NORMAL

## 2024-04-13 PROCEDURE — 80053 COMPREHEN METABOLIC PANEL: CPT | Performed by: EMERGENCY MEDICINE

## 2024-04-13 PROCEDURE — 80307 DRUG TEST PRSMV CHEM ANLYZR: CPT | Performed by: EMERGENCY MEDICINE

## 2024-04-13 PROCEDURE — 80143 DRUG ASSAY ACETAMINOPHEN: CPT | Performed by: EMERGENCY MEDICINE

## 2024-04-13 PROCEDURE — 36415 COLL VENOUS BLD VENIPUNCTURE: CPT | Performed by: EMERGENCY MEDICINE

## 2024-04-13 PROCEDURE — 82077 ASSAY SPEC XCP UR&BREATH IA: CPT | Performed by: EMERGENCY MEDICINE

## 2024-04-13 PROCEDURE — 99211 OFF/OP EST MAY X REQ PHY/QHP: CPT

## 2024-04-13 PROCEDURE — 85025 COMPLETE CBC W/AUTO DIFF WBC: CPT | Performed by: EMERGENCY MEDICINE

## 2024-04-13 PROCEDURE — 80179 DRUG ASSAY SALICYLATE: CPT | Performed by: EMERGENCY MEDICINE

## 2024-04-13 RX ORDER — SODIUM CHLORIDE 0.9 % (FLUSH) 0.9 %
10 SYRINGE (ML) INJECTION AS NEEDED
Status: DISCONTINUED | OUTPATIENT
Start: 2024-04-13 | End: 2024-04-13 | Stop reason: HOSPADM

## 2024-07-20 ENCOUNTER — APPOINTMENT (OUTPATIENT)
Dept: GENERAL RADIOLOGY | Facility: HOSPITAL | Age: 64
End: 2024-07-20
Payer: MEDICAID

## 2024-07-20 ENCOUNTER — HOSPITAL ENCOUNTER (EMERGENCY)
Facility: HOSPITAL | Age: 64
Discharge: HOME | End: 2024-07-20
Attending: EMERGENCY MEDICINE
Payer: MEDICAID

## 2024-07-20 VITALS
SYSTOLIC BLOOD PRESSURE: 113 MMHG | TEMPERATURE: 99 F | WEIGHT: 166.45 LBS | DIASTOLIC BLOOD PRESSURE: 69 MMHG | OXYGEN SATURATION: 97 % | HEIGHT: 75 IN | HEART RATE: 72 BPM | RESPIRATION RATE: 20 BRPM | BODY MASS INDEX: 20.7 KG/M2

## 2024-07-20 DIAGNOSIS — J20.9 ACUTE BRONCHITIS, UNSPECIFIED ORGANISM: Primary | ICD-10-CM

## 2024-07-20 DIAGNOSIS — R07.89 CHEST WALL PAIN: ICD-10-CM

## 2024-07-20 LAB
ALBUMIN SERPL-MCNC: 3.4 G/DL (ref 3.5–5.2)
ALBUMIN/GLOB SERPL: 1.3 G/DL
ALP SERPL-CCNC: 84 U/L (ref 39–117)
ALT SERPL W P-5'-P-CCNC: 9 U/L (ref 1–41)
ANION GAP SERPL CALCULATED.3IONS-SCNC: 11.6 MMOL/L (ref 5–15)
AST SERPL-CCNC: 16 U/L (ref 1–40)
BASOPHILS # BLD AUTO: 0.04 10*3/MM3 (ref 0–0.2)
BASOPHILS NFR BLD AUTO: 0.3 % (ref 0–1.5)
BILIRUB SERPL-MCNC: 0.3 MG/DL (ref 0–1.2)
BUN SERPL-MCNC: 23 MG/DL (ref 8–23)
BUN/CREAT SERPL: 18.9 (ref 7–25)
CALCIUM SPEC-SCNC: 8.7 MG/DL (ref 8.6–10.5)
CHLORIDE SERPL-SCNC: 100 MMOL/L (ref 98–107)
CO2 SERPL-SCNC: 20.4 MMOL/L (ref 22–29)
CREAT SERPL-MCNC: 1.22 MG/DL (ref 0.76–1.27)
DEPRECATED RDW RBC AUTO: 40.5 FL (ref 37–54)
EGFRCR SERPLBLD CKD-EPI 2021: 66.6 ML/MIN/1.73
EOSINOPHIL # BLD AUTO: 0.04 10*3/MM3 (ref 0–0.4)
EOSINOPHIL NFR BLD AUTO: 0.3 % (ref 0.3–6.2)
ERYTHROCYTE [DISTWIDTH] IN BLOOD BY AUTOMATED COUNT: 13.6 % (ref 12.3–15.4)
GLOBULIN UR ELPH-MCNC: 2.7 GM/DL
GLUCOSE SERPL-MCNC: 132 MG/DL (ref 65–99)
HCT VFR BLD AUTO: 36.6 % (ref 37.5–51)
HGB BLD-MCNC: 11.6 G/DL (ref 13–17.7)
HOLD SPECIMEN: NORMAL
HOLD SPECIMEN: NORMAL
IMM GRANULOCYTES # BLD AUTO: 0.07 10*3/MM3 (ref 0–0.05)
IMM GRANULOCYTES NFR BLD AUTO: 0.5 % (ref 0–0.5)
LIPASE SERPL-CCNC: 25 U/L (ref 13–60)
LYMPHOCYTES # BLD AUTO: 1.22 10*3/MM3 (ref 0.7–3.1)
LYMPHOCYTES NFR BLD AUTO: 9.4 % (ref 19.6–45.3)
MAGNESIUM SERPL-MCNC: 1.8 MG/DL (ref 1.6–2.4)
MCH RBC QN AUTO: 26.1 PG (ref 26.6–33)
MCHC RBC AUTO-ENTMCNC: 31.7 G/DL (ref 31.5–35.7)
MCV RBC AUTO: 82.2 FL (ref 79–97)
MONOCYTES # BLD AUTO: 1.11 10*3/MM3 (ref 0.1–0.9)
MONOCYTES NFR BLD AUTO: 8.5 % (ref 5–12)
NEUTROPHILS NFR BLD AUTO: 10.54 10*3/MM3 (ref 1.7–7)
NEUTROPHILS NFR BLD AUTO: 81 % (ref 42.7–76)
NRBC BLD AUTO-RTO: 0 /100 WBC (ref 0–0.2)
NT-PROBNP SERPL-MCNC: 674.3 PG/ML (ref 0–900)
PLATELET # BLD AUTO: 286 10*3/MM3 (ref 140–450)
PMV BLD AUTO: 9.8 FL (ref 6–12)
POTASSIUM SERPL-SCNC: 3.8 MMOL/L (ref 3.5–5.2)
PROT SERPL-MCNC: 6.1 G/DL (ref 6–8.5)
RBC # BLD AUTO: 4.45 10*6/MM3 (ref 4.14–5.8)
SODIUM SERPL-SCNC: 132 MMOL/L (ref 136–145)
TROPONIN T SERPL HS-MCNC: 9 NG/L
WBC NRBC COR # BLD AUTO: 13.02 10*3/MM3 (ref 3.4–10.8)
WHOLE BLOOD HOLD COAG: NORMAL
WHOLE BLOOD HOLD SPECIMEN: NORMAL

## 2024-07-20 PROCEDURE — 83690 ASSAY OF LIPASE: CPT | Performed by: EMERGENCY MEDICINE

## 2024-07-20 PROCEDURE — 85025 COMPLETE CBC W/AUTO DIFF WBC: CPT | Performed by: EMERGENCY MEDICINE

## 2024-07-20 PROCEDURE — 99284 EMERGENCY DEPT VISIT MOD MDM: CPT

## 2024-07-20 PROCEDURE — 83735 ASSAY OF MAGNESIUM: CPT | Performed by: EMERGENCY MEDICINE

## 2024-07-20 PROCEDURE — 84484 ASSAY OF TROPONIN QUANT: CPT | Performed by: EMERGENCY MEDICINE

## 2024-07-20 PROCEDURE — 80053 COMPREHEN METABOLIC PANEL: CPT | Performed by: EMERGENCY MEDICINE

## 2024-07-20 PROCEDURE — 71045 X-RAY EXAM CHEST 1 VIEW: CPT

## 2024-07-20 PROCEDURE — 83880 ASSAY OF NATRIURETIC PEPTIDE: CPT | Performed by: EMERGENCY MEDICINE

## 2024-07-20 RX ORDER — SODIUM CHLORIDE 0.9 % (FLUSH) 0.9 %
10 SYRINGE (ML) INJECTION AS NEEDED
Status: DISCONTINUED | OUTPATIENT
Start: 2024-07-20 | End: 2024-07-20 | Stop reason: HOSPADM

## 2024-07-20 RX ORDER — ALBUTEROL SULFATE 90 UG/1
2 AEROSOL, METERED RESPIRATORY (INHALATION) EVERY 4 HOURS PRN
Qty: 1 G | Refills: 0 | Status: SHIPPED | OUTPATIENT
Start: 2024-07-20

## 2024-07-20 RX ORDER — AZITHROMYCIN 250 MG/1
TABLET, FILM COATED ORAL
Qty: 6 TABLET | Refills: 0 | Status: SHIPPED | OUTPATIENT
Start: 2024-07-20

## 2024-07-20 RX ORDER — ASPIRIN 81 MG/1
324 TABLET, CHEWABLE ORAL ONCE
Status: DISCONTINUED | OUTPATIENT
Start: 2024-07-20 | End: 2024-07-20 | Stop reason: HOSPADM

## 2024-07-20 RX ORDER — PREDNISONE 20 MG/1
TABLET ORAL
Qty: 15 TABLET | Refills: 0 | Status: SHIPPED | OUTPATIENT
Start: 2024-07-20

## 2024-07-20 NOTE — ED NOTES
"Pt verbalized concern with affording medications being prescribed to him today. RN offered to have social work come speak with the pt. Pt stated he was \"just ready to go and would handle it Monday.\"  "
Patient arrived via EMS. Patient c/o chest pain and SOB.  
None known

## 2024-07-20 NOTE — DISCHARGE INSTRUCTIONS
Drink plenty of fluids.  Stop smoking.  Take medication.  Return for worsening symptoms.  Follow-up with your doctor in 2 days if no better.

## 2024-07-20 NOTE — ED PROVIDER NOTES
Time: 11:46 AM EDT  Date of encounter:  7/20/2024  Independent Historian/Clinical History and Information was obtained by:   Patient    History is limited by: N/A    Chief Complaint: Chest pain      History of Present Illness:  Patient is a 64 y.o. year old male who presents to the emergency department for evaluation of chest pain.  The patient states that he had cough shortness of breath and chest pain for the last several days.  He has been coughing up some yellow to gray material.  The patient has had a low-grade fever.  He denies any vomiting diarrhea or abdominal pain.  He states that the pain is worse when he moves or takes a deep breath.  It is primarily right-sided.    HPI    Patient Care Team  Primary Care Provider: Emily Siddiqui APRN    Past Medical History:     No Known Allergies  Past Medical History:   Diagnosis Date    Anxiety     Arthritis     COPD (chronic obstructive pulmonary disease)     Hypertension     Injury of back      Past Surgical History:   Procedure Laterality Date    APPENDECTOMY      EYE SURGERY  2007    KNEE SURGERY Right 2005     Family History   Problem Relation Age of Onset    Cancer Mother     Cancer Sister     Cancer Brother        Home Medications:  Prior to Admission medications    Medication Sig Start Date End Date Taking? Authorizing Provider   cyclobenzaprine (FLEXERIL) 10 MG tablet Take 1 tablet by mouth 3 (Three) Times a Day As Needed for Muscle Spasms. 3/22/23   Olga Velazquez APRN   ketorolac (TORADOL) 10 MG tablet Take 1 tablet by mouth Every 6 (Six) Hours As Needed for Moderate Pain. 3/22/23   Olga Velazquez APRN   predniSONE (DELTASONE) 20 MG tablet Take 1 tablet by mouth 2 (Two) Times a Day. 3/22/23   Olga Velazquez APRN        Social History:   Social History     Tobacco Use    Smoking status: Every Day     Current packs/day: 1.00     Types: Cigars, Cigarettes    Smokeless tobacco: Never   Substance Use Topics    Alcohol use: Not Currently     Comment: occ    Drug  "use: No         Review of Systems:  Review of Systems   Constitutional:  Negative for chills and fever.   HENT:  Negative for congestion, ear pain and sore throat.    Eyes:  Negative for pain.   Respiratory:  Positive for cough and shortness of breath. Negative for chest tightness.    Cardiovascular:  Positive for chest pain.   Gastrointestinal:  Negative for abdominal pain, diarrhea, nausea and vomiting.   Genitourinary:  Negative for flank pain and hematuria.   Musculoskeletal:  Negative for joint swelling.   Skin:  Negative for pallor.   Neurological:  Negative for seizures and headaches.   All other systems reviewed and are negative.       Physical Exam:  /69   Pulse 72   Temp 99 °F (37.2 °C) (Oral)   Resp 20   Ht 190.5 cm (75\")   Wt 75.5 kg (166 lb 7.2 oz)   SpO2 97%   BMI 20.80 kg/m²     Physical Exam  Vitals and nursing note reviewed.   Constitutional:       General: He is not in acute distress.     Appearance: Normal appearance. He is not toxic-appearing.   HENT:      Head: Normocephalic and atraumatic.      Mouth/Throat:      Mouth: Mucous membranes are moist.   Eyes:      General: No scleral icterus.  Cardiovascular:      Rate and Rhythm: Normal rate and regular rhythm.      Pulses: Normal pulses.      Heart sounds: Normal heart sounds.   Pulmonary:      Effort: Pulmonary effort is normal. No respiratory distress.      Breath sounds: Normal breath sounds.   Chest:      Chest wall: Tenderness present.      Comments: Palpation of the right chest wall reproduces the patient's symptoms.  Abdominal:      General: Abdomen is flat.      Palpations: Abdomen is soft.      Tenderness: There is no abdominal tenderness.   Musculoskeletal:         General: Normal range of motion.      Cervical back: Normal range of motion and neck supple.   Skin:     General: Skin is warm and dry.   Neurological:      Mental Status: He is alert and oriented to person, place, and time. Mental status is at baseline.      "             Procedures:  Procedures      Medical Decision Making:      Comorbidities that affect care:    COPD, Smoking    External Notes reviewed:    Previous Clinic Note: Neurology clinic visit      The following orders were placed and all results were independently analyzed by me:  Orders Placed This Encounter   Procedures    XR Chest 1 View    [place] Draw    High Sensitivity Troponin T    Comprehensive Metabolic Panel    Lipase    BNP    Magnesium    CBC Auto Differential    Undress & Gown    Continuous Pulse Oximetry    CBC & Differential    Green Top (Gel)    Lavender Top    Gold Top - SST    Light Blue Top       Medications Given in the Emergency Department:  Medications - No data to display       ED Course:    ED Course as of 07/20/24 2312   Sat Jul 20, 2024   1147 Monocytes Absolute(!): 1.11 [PP]      ED Course User Index  [PP] Sudeep Warren, DO       Labs:    Lab Results (last 24 hours)       Procedure Component Value Units Date/Time    High Sensitivity Troponin T [[id number]]  (Normal) Collected: 07/20/24 1012    Specimen: Blood from Arm, Left Updated: 07/20/24 1107     HS Troponin T 9 ng/L     Narrative:      High Sensitive Troponin T Reference Range:  <14.0 ng/L- Negative Female for AMI  <22.0 ng/L- Negative Male for AMI  >=14 - Abnormal Female indicating possible myocardial injury.  >=22 - Abnormal Male indicating possible myocardial injury.   Clinicians would have to utilize clinical acumen, EKG, Troponin, and serial changes to determine if it is an Acute Myocardial Infarction or myocardial injury due to an underlying chronic condition.         CBC & Differential [[id number]]  (Abnormal) Collected: 07/20/24 1012    Specimen: Blood from Arm, Left Updated: 07/20/24 1030    Narrative:      The following orders were created for panel order CBC & Differential.  Procedure                               Abnormality         Status                     ---------                               -----------          ------                     CBC Auto Differential[[id number]]        Abnormal            Final result                 Please view results for these tests on the individual orders.    Comprehensive Metabolic Panel [[id number]]  (Abnormal) Collected: 07/20/24 1012    Specimen: Blood from Arm, Left Updated: 07/20/24 1107     Glucose 132 mg/dL      BUN 23 mg/dL      Creatinine 1.22 mg/dL      Sodium 132 mmol/L      Potassium 3.8 mmol/L      Chloride 100 mmol/L      CO2 20.4 mmol/L      Calcium 8.7 mg/dL      Total Protein 6.1 g/dL      Albumin 3.4 g/dL      ALT (SGPT) 9 U/L      AST (SGOT) 16 U/L      Alkaline Phosphatase 84 U/L      Total Bilirubin 0.3 mg/dL      Globulin 2.7 gm/dL      A/G Ratio 1.3 g/dL      BUN/Creatinine Ratio 18.9     Anion Gap 11.6 mmol/L      eGFR 66.6 mL/min/1.73     Narrative:      GFR Normal >60  Chronic Kidney Disease <60  Kidney Failure <15      Lipase [[id number]]  (Normal) Collected: 07/20/24 1012    Specimen: Blood from Arm, Left Updated: 07/20/24 1107     Lipase 25 U/L     BNP [[id number]]  (Normal) Collected: 07/20/24 1012    Specimen: Blood from Arm, Left Updated: 07/20/24 1100     proBNP 674.3 pg/mL     Narrative:      This assay is used as an aid in the diagnosis of individuals suspected of having heart failure. It can be used as an aid in the diagnosis of acute decompensated heart failure (ADHF) in patients presenting with signs and symptoms of ADHF to the emergency department (ED). In addition, NT-proBNP of <300 pg/mL indicates ADHF is not likely.    Age Range Result Interpretation  NT-proBNP Concentration (pg/mL:      <50             Positive            >450                   Gray                 300-450                    Negative             <300    50-75           Positive            >900                  Gray                300-900                  Negative            <300      >75             Positive            >1800                  Gray                300-1800                   Negative            <300    Magnesium [[id number]]  (Normal) Collected: 07/20/24 1012    Specimen: Blood from Arm, Left Updated: 07/20/24 1107     Magnesium 1.8 mg/dL     CBC Auto Differential [[id number]]  (Abnormal) Collected: 07/20/24 1012    Specimen: Blood from Arm, Left Updated: 07/20/24 1030     WBC 13.02 10*3/mm3      RBC 4.45 10*6/mm3      Hemoglobin 11.6 g/dL      Hematocrit 36.6 %      MCV 82.2 fL      MCH 26.1 pg      MCHC 31.7 g/dL      RDW 13.6 %      RDW-SD 40.5 fl      MPV 9.8 fL      Platelets 286 10*3/mm3      Neutrophil % 81.0 %      Lymphocyte % 9.4 %      Monocyte % 8.5 %      Eosinophil % 0.3 %      Basophil % 0.3 %      Immature Grans % 0.5 %      Neutrophils, Absolute 10.54 10*3/mm3      Lymphocytes, Absolute 1.22 10*3/mm3      Monocytes, Absolute 1.11 10*3/mm3      Eosinophils, Absolute 0.04 10*3/mm3      Basophils, Absolute 0.04 10*3/mm3      Immature Grans, Absolute 0.07 10*3/mm3      nRBC 0.0 /100 WBC              Imaging:    XR Chest 1 View    Result Date: 7/20/2024  XR CHEST 1 VW Date of Exam: 7/20/2024 10:20 AM EDT Indication: Chest Pain Triage Protocol Comparison: 8/9/2019 Findings: Probable granuloma within the left midlung. Small calcified right hilar lymph nodes noted. The lungs appear adequately aerated without consolidation or mass. No pleural effusion or pneumothorax is identified. The cardiomediastinal silhouette and pulmonary vasculature appear within normal limits. No acute or suspicious osseous lesion is identified.     Impression: 1.No acute radiographic abnormality is identified. Electronically Signed: Bonifacio Rose MD  7/20/2024 10:32 AM EDT  Workstation ID: [id number]       Differential Diagnosis and Discussion:    Chest Pain:  Based on the patient's signs and symptoms, I considered aortic dissection, myocardial infaction, pulmonary embolism, cardiac tamponade, pericarditis, pneumothorax, musculoskeletal chest pain and other differential diagnosis as an etiology of the  patient's chest pain.     All labs were reviewed and interpreted by me.  All X-rays impressions were independently interpreted by me.  EKG was interpreted by me.    MDM     Amount and/or Complexity of Data Reviewed  Clinical lab tests: reviewed  Tests in the radiology section of CPT®: reviewed                 Patient Care Considerations:    CT CHEST: I considered ordering a CT scan of the chest, however the patient has reproducible chest tenderness and no signs of pulmonary embolism      Consultants/Shared Management Plan:    None    Social Determinants of Health:    Patient is independent, reliable, and has access to care.       Disposition and Care Coordination:    Discharged: I considered escalation of care by admitting this patient to the hospital, however patient's vital signs are stable and he is a low risk heart score.    I have explained discharge medications and the need for follow up with the patient/caretakers. This was also printed in the discharge instructions. Patient was discharged with the following medications and follow up:      Medication List        New Prescriptions      albuterol sulfate  (90 Base) MCG/ACT inhaler  Commonly known as: PROVENTIL HFA;VENTOLIN HFA;PROAIR HFA  Inhale 2 puffs Every 4 (Four) Hours As Needed for Wheezing.     azithromycin 250 MG tablet  Commonly known as: Zithromax Z-Tony  Take 2 tablets by mouth on day 1, then 1 tablet daily on days 2-5            Changed      * predniSONE 20 MG tablet  Commonly known as: DELTASONE  Take 1 tablet by mouth 2 (Two) Times a Day.  What changed: Another medication with the same name was added. Make sure you understand how and when to take each.     * predniSONE 20 MG tablet  Commonly known as: DELTASONE  Take 3 p.o. daily for 5 days.  What changed: You were already taking a medication with the same name, and this prescription was added. Make sure you understand how and when to take each.           * This list has 2 medication(s) that  are the same as other medications prescribed for you. Read the directions carefully, and ask your doctor or other care provider to review them with you.                   Where to Get Your Medications        These medications were sent to [facility] - [place] - [address] - [phone]  - [phone]   [address] [facility] [address]      Phone: [phone]   albuterol sulfate  (90 Base) MCG/ACT inhaler  azithromycin 250 MG tablet  predniSONE 20 MG tablet      Emily Siddiqui APRN  [address] [id number] [phone]    In 2 days  If no better.       Final diagnoses:   Acute bronchitis, unspecified organism   Chest wall pain        ED Disposition       ED Disposition   Discharge    Condition   Stable    Comment   --               This medical record created using voice recognition software.             Sudeep Warren,   07/20/24 2630